# Patient Record
Sex: FEMALE | Race: WHITE | NOT HISPANIC OR LATINO | Employment: FULL TIME | URBAN - METROPOLITAN AREA
[De-identification: names, ages, dates, MRNs, and addresses within clinical notes are randomized per-mention and may not be internally consistent; named-entity substitution may affect disease eponyms.]

---

## 2019-07-26 LAB
C TRACH RRNA SPEC QL PROBE: NOT DETECTED
N GONORRHOEA RRNA SPEC QL PROBE: NOT DETECTED

## 2019-08-12 ENCOUNTER — TRANSCRIBE ORDERS (OUTPATIENT)
Dept: PERINATAL CARE | Facility: CLINIC | Age: 29
End: 2019-08-12

## 2019-08-12 DIAGNOSIS — O09.899 SUPERVISION OF OTHER HIGH RISK PREGNANCIES, UNSPECIFIED TRIMESTER: Primary | ICD-10-CM

## 2019-08-14 LAB — HCV AB SER-ACNC: <0.1

## 2019-08-19 ENCOUNTER — TRANSCRIBE ORDERS (OUTPATIENT)
Dept: PERINATAL CARE | Facility: OTHER | Age: 29
End: 2019-08-19

## 2019-08-19 DIAGNOSIS — O09.899 SUPERVISION OF OTHER HIGH RISK PREGNANCIES, UNSPECIFIED TRIMESTER: Primary | ICD-10-CM

## 2020-02-14 ENCOUNTER — TRANSCRIBE ORDERS (OUTPATIENT)
Dept: PERINATAL CARE | Facility: OTHER | Age: 30
End: 2020-02-14

## 2020-02-14 DIAGNOSIS — O09.899 SUPERVISION OF OTHER HIGH RISK PREGNANCIES, UNSPECIFIED TRIMESTER: Primary | ICD-10-CM

## 2020-02-16 PROBLEM — O36.8390 FETAL ARRHYTHMIA AFFECTING PREGNANCY, ANTEPARTUM: Status: ACTIVE | Noted: 2020-02-16

## 2020-02-16 NOTE — PATIENT INSTRUCTIONS
Please go to [facility] now for evaluation  Thank you for choosing us for your  care today  If you have any questions about your ultrasound or care, please do not hesitate to contact us or your primary obstetrician  Some general instructions for your pregnancy are:     Exercise: we encourage most pregnant women to get regular physical activity in pregnancy  Exercise has been shown to reduce the risk of several pregnancy-related complications  Unless instructed otherwise, you can aim for 22 minutes per day (150 minutes per week! )   Nutrition: aim for calcium-rich and iron-rich foods as well as healthy sources of protein   Weight: ask your doctor what is the appropriate amount of weight for you to gain in pregnancy  We have nutritionists here if you would like to meet with them   Protect against the flu: get yourself and your entire household vaccinated against influenza  Tell your partner to get vaccinated as well  Good hand hygiene can reduce the spread of this potentially deadly virus  Insist that everyone who is going to hold or be around your baby get vaccinated   Learn about Preeclampsia: preeclampsia is a common, serious complication in pregnancy  A blood pressure of 140mmHg (top number or systolic) OR 14EUCJ (bottom number or diastolic) is elevated and needs evaluation by your doctor  Ask your doctor early in pregnancy if you should take aspirin (not motrin or tylenol) to prevent preeclampsia  If you were advised to take aspirin to prevent preeclampsia, a daily dose of 162mg or 81mg is advised  One resource to learn more is www  preeclampsia org    If you smoke, try to reduce how many cigarettes you smoke or quit completely  Do not vape       Other warning signs to watch out for in pregnancy or postpartum: chest pain, obstructed breathing or shortness of breath, seizures, thoughts of hurting yourself or your baby, bleeding, a painful or swollen leg, fever, or headache ([facility] POST-BIRTH Warning Signs campaign)  If these happen call 911  Itching is also not normal in pregnancy and if you experience this, especially over your hands and feet, potentially worse at night, notify your doctors

## 2020-02-17 ENCOUNTER — ROUTINE PRENATAL (OUTPATIENT)
Dept: PERINATAL CARE | Facility: OTHER | Age: 30
End: 2020-02-17
Payer: COMMERCIAL

## 2020-02-17 VITALS
DIASTOLIC BLOOD PRESSURE: 84 MMHG | BODY MASS INDEX: 34.26 KG/M2 | HEART RATE: 99 BPM | SYSTOLIC BLOOD PRESSURE: 147 MMHG | WEIGHT: 193.34 LBS | HEIGHT: 63 IN

## 2020-02-17 DIAGNOSIS — O13.3: ICD-10-CM

## 2020-02-17 DIAGNOSIS — Z36.3 ENCOUNTER FOR ANTENATAL SCREENING FOR MALFORMATIONS: ICD-10-CM

## 2020-02-17 DIAGNOSIS — O36.8390 FETAL ARRHYTHMIA AFFECTING PREGNANCY, ANTEPARTUM: Primary | ICD-10-CM

## 2020-02-17 PROCEDURE — 99242 OFF/OP CONSLTJ NEW/EST SF 20: CPT | Performed by: OBSTETRICS & GYNECOLOGY

## 2020-02-17 PROCEDURE — 76811 OB US DETAILED SNGL FETUS: CPT | Performed by: OBSTETRICS & GYNECOLOGY

## 2020-02-17 NOTE — LETTER
February 17, 2020     [address]    Patient: Vesta Sees   YOB: 1990   Date of Visit: 2/17/2020       Dear Dr Brittanie Garcia: Thank you for referring Vesta Sees to me for evaluation  Ultrasound report is coming by fax  She is being sent to your L&D for evaluation for hypertension  If you have questions, please do not hesitate to call me  I look forward to following your patient along with you           Sincerely,        Duke Mora MD        CC: No Recipients

## 2020-02-17 NOTE — PROGRESS NOTES
[address]: Ms Jovani Osman was seen today at 37w3d for anatomic survey ultrasound  See ultrasound report under "OB Procedures" tab  Please don't hesitate to contact our office with any concerns or questions    Viola Blanton MD

## 2020-02-17 NOTE — PROGRESS NOTES
Spoke with Gagandeep Doe RN at [facility]   Report provided  Declines questions  Dr Alber Maria requests no follow up blood pressures

## 2020-02-19 ENCOUNTER — TELEPHONE (OUTPATIENT)
Dept: PERINATAL CARE | Facility: CLINIC | Age: 30
End: 2020-02-19

## 2020-02-19 NOTE — TELEPHONE ENCOUNTER
Pt called to schedule next visit  Lou checked and Dr Sharon Sparks recommended weekly Doppler  No Doppler appts available in [facility] office  Offered pt another location  She will call [facility] central scheduling to see if she can have through [facility]  Pt will call back if she needs to schedule Doppler at another [facility] office

## 2024-04-24 ENCOUNTER — OFFICE VISIT (OUTPATIENT)
Dept: URGENT CARE | Facility: CLINIC | Age: 34
End: 2024-04-24
Payer: COMMERCIAL

## 2024-04-24 VITALS
HEIGHT: 63 IN | RESPIRATION RATE: 16 BRPM | TEMPERATURE: 99.3 F | SYSTOLIC BLOOD PRESSURE: 126 MMHG | WEIGHT: 200 LBS | HEART RATE: 86 BPM | OXYGEN SATURATION: 99 % | BODY MASS INDEX: 35.44 KG/M2 | DIASTOLIC BLOOD PRESSURE: 74 MMHG

## 2024-04-24 DIAGNOSIS — L03.113 CELLULITIS OF RIGHT UPPER EXTREMITY: Primary | ICD-10-CM

## 2024-04-24 PROCEDURE — 99213 OFFICE O/P EST LOW 20 MIN: CPT | Performed by: PHYSICIAN ASSISTANT

## 2024-04-24 RX ORDER — CEPHALEXIN 500 MG/1
500 CAPSULE ORAL EVERY 6 HOURS SCHEDULED
Qty: 20 CAPSULE | Refills: 0 | Status: SHIPPED | OUTPATIENT
Start: 2024-04-24 | End: 2024-04-29

## 2024-04-24 NOTE — PATIENT INSTRUCTIONS
Take antibiotic as directed  Apply warm soaks  Follow up with [facility] or ED if fever/chills or worsening redness develops   Follow up with PCP in 3-5 days.  Proceed to  ER if symptoms worsen.    If tests have been performed at [facility], our office will contact you with results if changes need to be made to the care plan discussed with you at the visit.  You can review your full results on [facility] MyChart.

## 2024-04-24 NOTE — PROGRESS NOTES
[facility]        NAME: Zayda Taylor is a 33 y.o. female  : 1990    MRN: [id number]  DATE: 2024  TIME: 7:39 PM    Assessment and Plan   Cellulitis of right upper extremity [L03.113]  1. Cellulitis of right upper extremity  cephalexin (KEFLEX) 500 mg capsule            Patient Instructions     Take antibiotic as directed  Apply warm soaks  Follow up with [facility] or ED if fever/chills or worsening redness develops   Follow up with PCP in 3-5 days.  Proceed to  ER if symptoms worsen.    If tests have been performed at [facility], our office will contact you with results if changes need to be made to the care plan discussed with you at the visit.  You can review your full results on [facility] MyChart.    Chief Complaint     Chief Complaint   Patient presents with    Skin Problem     Pt reports 2 days ago she noted a bump on her right wrist. Reports redness/tender. Denies fevers. Currently 37 weeks pregnant.          History of Present Illness       HPI  34 y/o female who is 37 weeks pregnant presents for evaluation of red spot on her right wrist-flexor surface.  She states she noted the spot a few days ago, she thinks she may have been bitten by a bug but does not specifically recall any incident.  The area formed a head and she did try to pop it but only a small amount of pus was expressed and now the redness and tenderness have worsened.  She denies fever/chills or streaking redness up the arm.    PMH: non-contributory  Review of Systems   Review of Systems   Constitutional:  Negative for chills and fever.   HENT:  Negative for ear pain and sore throat.    Eyes:  Negative for pain and visual disturbance.   Respiratory:  Negative for cough and shortness of breath.    Cardiovascular:  Negative for chest pain and palpitations.   Gastrointestinal:  Negative for abdominal pain and vomiting.   Genitourinary:  Negative for dysuria and hematuria.   Musculoskeletal:  Negative for arthralgias and  "back pain.   Skin:  Positive for color change and rash.   Neurological:  Negative for seizures and syncope.   All other systems reviewed and are negative.        Current Medications       Current Outpatient Medications:     cephalexin (KEFLEX) 500 mg capsule, Take 1 capsule (500 mg total) by mouth every 6 (six) hours for 5 days, Disp: 20 capsule, Rfl: 0    Prenatal Vit w/Ss-Azlaznxve-EI (PNV PO), Take 1 tablet by mouth daily, Disp: , Rfl:     Omega-3 Fatty Acids (OMEGA-3 FISH OIL) 1000 MG CAPS, Take 1 capsule by mouth daily (Patient not taking: Reported on 2024), Disp: , Rfl:     Current Allergies     Allergies as of 2024    (No Known Allergies)            The following portions of the patient's history were reviewed and updated as appropriate: allergies, current medications, past family history, past medical history, past social history, past surgical history and problem list.     Past Medical History:   Diagnosis Date    H/O vein stripping        Past Surgical History:   Procedure Laterality Date    INDUCED       OTHER SURGICAL HISTORY      gynecological surgery       No family history on file.      Medications have been verified.        Objective   /74   Pulse 86   Temp 99.3 °F (37.4 °C)   Resp 16   Ht 5' 3\" (1.6 m)   Wt 90.7 kg (200 lb)   LMP  (Exact Date)   SpO2 99%   BMI 35.43 kg/m²   No LMP recorded (exact date). Patient is pregnant.       Physical Exam     Physical Exam  Vitals and nursing note reviewed.   Constitutional:       General: She is not in acute distress.     Appearance: Normal appearance.   HENT:      Head: Normocephalic and atraumatic.   Cardiovascular:      Rate and Rhythm: Normal rate and regular rhythm.      Pulses: Normal pulses.      Heart sounds: Normal heart sounds.   Pulmonary:      Effort: Pulmonary effort is normal.      Breath sounds: Normal breath sounds.   Skin:     General: Skin is warm and dry.      Comments: Right wrist:  On the flexor surface is " a tiny centralized area of purulent fluid with surrounding erythema and mild warmth which is tender.  There is a small area of induration below the purulent material but no fluctuance.    Full ROM of hand and wrist  NVI    Neurological:      Mental Status: She is alert and oriented to person, place, and time.   Psychiatric:         Mood and Affect: Mood normal.         Behavior: Behavior normal.